# Patient Record
Sex: FEMALE | Race: WHITE | NOT HISPANIC OR LATINO | Employment: FULL TIME
[De-identification: names, ages, dates, MRNs, and addresses within clinical notes are randomized per-mention and may not be internally consistent; named-entity substitution may affect disease eponyms.]

---

## 2017-09-10 ENCOUNTER — HEALTH MAINTENANCE LETTER (OUTPATIENT)
Age: 27
End: 2017-09-10

## 2019-10-27 ENCOUNTER — OFFICE VISIT - HEALTHEAST (OUTPATIENT)
Dept: FAMILY MEDICINE | Facility: CLINIC | Age: 29
End: 2019-10-27

## 2019-10-27 DIAGNOSIS — M54.17 RIGHT LUMBOSACRAL RADICULOPATHY: ICD-10-CM

## 2019-11-08 ENCOUNTER — HEALTH MAINTENANCE LETTER (OUTPATIENT)
Age: 29
End: 2019-11-08

## 2020-02-23 ENCOUNTER — HEALTH MAINTENANCE LETTER (OUTPATIENT)
Age: 30
End: 2020-02-23

## 2020-05-27 ENCOUNTER — COMMUNICATION - HEALTHEAST (OUTPATIENT)
Dept: SCHEDULING | Facility: CLINIC | Age: 30
End: 2020-05-27

## 2020-12-06 ENCOUNTER — HEALTH MAINTENANCE LETTER (OUTPATIENT)
Age: 30
End: 2020-12-06

## 2021-01-12 ENCOUNTER — COMMUNICATION - HEALTHEAST (OUTPATIENT)
Dept: SCHEDULING | Facility: CLINIC | Age: 31
End: 2021-01-12

## 2021-06-03 VITALS
WEIGHT: 170.4 LBS | BODY MASS INDEX: 28.36 KG/M2 | HEART RATE: 82 BPM | TEMPERATURE: 98.3 F | DIASTOLIC BLOOD PRESSURE: 78 MMHG | SYSTOLIC BLOOD PRESSURE: 111 MMHG | OXYGEN SATURATION: 98 %

## 2021-06-08 NOTE — TELEPHONE ENCOUNTER
29 y/o female calls about cheek bone to ear and forehead pain, headache to left side of head, Ibuprofen 1 hour ago, pain is 7 out of 10    RN reviewed and discussed protocols for moderate headaches, interventions including lying down in dark quiet place, cold cloth to forehead, stretch and massage, however, protocols do advise follow up in the next 24 hours, warm transfer to be seen in the next 24 hours    Rosa Gaming RN Triage Nurse/[facility] Connections  05/27/2020   1:32AM       Reason for Disposition    [1] MODERATE headache (e.g., interferes with normal activities) AND [2] present > 24 hours AND [3] unexplained  (Exceptions: analgesics not tried, typical migraine, or headache part of viral illness)    Protocols used: HEADACHE-A-AH    Fever 100, no cough or shortness of breath, sore throat lighthead, one visitor, Benja
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
## 2021-10-16 ENCOUNTER — HEALTH MAINTENANCE LETTER (OUTPATIENT)
Age: 31
End: 2021-10-16

## 2022-01-30 ENCOUNTER — HEALTH MAINTENANCE LETTER (OUTPATIENT)
Age: 32
End: 2022-01-30

## 2022-09-25 ENCOUNTER — HEALTH MAINTENANCE LETTER (OUTPATIENT)
Age: 32
End: 2022-09-25

## 2023-02-04 ENCOUNTER — HEALTH MAINTENANCE LETTER (OUTPATIENT)
Age: 33
End: 2023-02-04

## 2023-03-08 ENCOUNTER — APPOINTMENT (OUTPATIENT)
Dept: RADIOLOGY | Facility: HOSPITAL | Age: 33
End: 2023-03-08
Attending: STUDENT IN AN ORGANIZED HEALTH CARE EDUCATION/TRAINING PROGRAM
Payer: COMMERCIAL

## 2023-03-08 ENCOUNTER — HOSPITAL ENCOUNTER (EMERGENCY)
Facility: HOSPITAL | Age: 33
Discharge: HOME | End: 2023-03-08
Attending: STUDENT IN AN ORGANIZED HEALTH CARE EDUCATION/TRAINING PROGRAM | Admitting: STUDENT IN AN ORGANIZED HEALTH CARE EDUCATION/TRAINING PROGRAM
Payer: COMMERCIAL

## 2023-03-08 VITALS
WEIGHT: 170 LBS | TEMPERATURE: 99 F | SYSTOLIC BLOOD PRESSURE: 139 MMHG | HEIGHT: 65 IN | OXYGEN SATURATION: 98 % | RESPIRATION RATE: 18 BRPM | HEART RATE: 69 BPM | DIASTOLIC BLOOD PRESSURE: 77 MMHG | BODY MASS INDEX: 28.32 KG/M2

## 2023-03-08 DIAGNOSIS — R07.9 CHEST PAIN, UNSPECIFIED TYPE: ICD-10-CM

## 2023-03-08 LAB
ALBUMIN SERPL BCG-MCNC: 4 G/DL (ref 3.5–5.2)
ALP SERPL-CCNC: 84 U/L (ref 35–104)
ALT SERPL W P-5'-P-CCNC: 24 U/L (ref 10–35)
ANION GAP SERPL CALCULATED.3IONS-SCNC: 12 MMOL/L (ref 7–15)
AST SERPL W P-5'-P-CCNC: 22 U/L (ref 10–35)
BILIRUB SERPL-MCNC: 0.2 MG/DL
BUN SERPL-MCNC: 8.8 MG/DL (ref 6–20)
CALCIUM SERPL-MCNC: 8.8 MG/DL (ref 8.6–10)
CHLORIDE SERPL-SCNC: 103 MMOL/L (ref 98–107)
CREAT SERPL-MCNC: 0.56 MG/DL (ref 0.51–0.95)
DEPRECATED HCO3 PLAS-SCNC: 22 MMOL/L (ref 22–29)
ERYTHROCYTE [DISTWIDTH] IN BLOOD BY AUTOMATED COUNT: 12.7 % (ref 10–15)
GFR SERPL CREATININE-BSD FRML MDRD: >90 ML/MIN/1.73M2
GLUCOSE SERPL-MCNC: 100 MG/DL (ref 70–99)
HCG UR QL: NEGATIVE
HCT VFR BLD AUTO: 40.2 % (ref 35–47)
HGB BLD-MCNC: 13 G/DL (ref 11.7–15.7)
MCH RBC QN AUTO: 29 PG (ref 26.5–33)
MCHC RBC AUTO-ENTMCNC: 32.3 G/DL (ref 31.5–36.5)
MCV RBC AUTO: 90 FL (ref 78–100)
PLATELET # BLD AUTO: 373 10E3/UL (ref 150–450)
POTASSIUM SERPL-SCNC: 4.1 MMOL/L (ref 3.4–5.3)
PROT SERPL-MCNC: 7.8 G/DL (ref 6.4–8.3)
RBC # BLD AUTO: 4.49 10E6/UL (ref 3.8–5.2)
SODIUM SERPL-SCNC: 137 MMOL/L (ref 136–145)
TROPONIN T SERPL HS-MCNC: <6 NG/L
WBC # BLD AUTO: 8.3 10E3/UL (ref 4–11)

## 2023-03-08 PROCEDURE — 85027 COMPLETE CBC AUTOMATED: CPT | Performed by: STUDENT IN AN ORGANIZED HEALTH CARE EDUCATION/TRAINING PROGRAM

## 2023-03-08 PROCEDURE — 36415 COLL VENOUS BLD VENIPUNCTURE: CPT | Performed by: STUDENT IN AN ORGANIZED HEALTH CARE EDUCATION/TRAINING PROGRAM

## 2023-03-08 PROCEDURE — 71046 X-RAY EXAM CHEST 2 VIEWS: CPT

## 2023-03-08 PROCEDURE — 93005 ELECTROCARDIOGRAM TRACING: CPT | Performed by: STUDENT IN AN ORGANIZED HEALTH CARE EDUCATION/TRAINING PROGRAM

## 2023-03-08 PROCEDURE — 99285 EMERGENCY DEPT VISIT HI MDM: CPT | Mod: 25

## 2023-03-08 PROCEDURE — 80053 COMPREHEN METABOLIC PANEL: CPT | Performed by: STUDENT IN AN ORGANIZED HEALTH CARE EDUCATION/TRAINING PROGRAM

## 2023-03-08 PROCEDURE — 250N000013 HC RX MED GY IP 250 OP 250 PS 637: Performed by: STUDENT IN AN ORGANIZED HEALTH CARE EDUCATION/TRAINING PROGRAM

## 2023-03-08 PROCEDURE — 84484 ASSAY OF TROPONIN QUANT: CPT | Performed by: STUDENT IN AN ORGANIZED HEALTH CARE EDUCATION/TRAINING PROGRAM

## 2023-03-08 PROCEDURE — 81025 URINE PREGNANCY TEST: CPT | Performed by: STUDENT IN AN ORGANIZED HEALTH CARE EDUCATION/TRAINING PROGRAM

## 2023-03-08 RX ORDER — VALACYCLOVIR HYDROCHLORIDE 1 G/1
1000 TABLET, FILM COATED ORAL 3 TIMES DAILY
Qty: 21 TABLET | Refills: 0 | Status: SHIPPED | OUTPATIENT
Start: 2023-03-08 | End: 2023-03-15

## 2023-03-08 RX ORDER — ACETAMINOPHEN 325 MG/1
650 TABLET ORAL ONCE
Status: COMPLETED | OUTPATIENT
Start: 2023-03-08 | End: 2023-03-08

## 2023-03-08 RX ADMIN — ACETAMINOPHEN 650 MG: 325 TABLET ORAL at 11:45

## 2023-03-08 NOTE — ED TRIAGE NOTES
"Right sided chest pain. Pt reports \"zinging\" type pain in middle of chest intermittently. Pain to right side of chest wrapping around to back \"feels like pins and needles.\" pain on right increased with palp. No rash noted.      Triage Assessment     Row Name 03/08/23 1048       Triage Assessment (Adult)    Airway WDL WDL       Respiratory WDL    Respiratory WDL WDL       Skin Circulation/Temperature WDL    Skin Circulation/Temperature WDL WDL       Cardiac WDL    Cardiac WDL chest pain       Peripheral/Neurovascular WDL    Peripheral Neurovascular WDL WDL       Cognitive/Neuro/Behavioral WDL    Cognitive/Neuro/Behavioral WDL WDL              "

## 2023-03-08 NOTE — DISCHARGE INSTRUCTIONS
Your workup today was overall reassuring  Further cardiac or breast testing may be considered by your primary care doctor so follow up closely with them. I will place a cardiology referral you c an consider using as well.  As we discussed this could possibly be shingles, if you develop a rash in this area start the antiviral medication    You can take 1000 mg of tylenol every 6-8 hours (no more than 4000 mg in 24 hours).  You can take 600 mg of ibuprofen with food every 6-8 hours (no more than 3200 mg in 24 hours).     Return to the Emergency Department with new/worsening chest pain, increased difficulty breathing or any worsening symptoms or concerns

## 2023-03-08 NOTE — ED PROVIDER NOTES
NAME: Alberta Whalen  AGE: 32 year old female  YOB: 1990  MRN: [id number]  EVALUATION DATE & TIME: No admission date for patient encounter.    PCP: No Ref-Primary, Physician  ED PROVIDER: Breanna Marroquin MD.    Chief Complaint   Patient presents with     Chest Pain       FINAL IMPRESSION:  1. Chest pain, unspecified type      MEDICAL DECISION MAKIN:25 AM I met with the patient, obtained history, performed an initial exam, and discussed options and plan for diagnostics and treatment here in the ED.   12:51 PM Updated the patient about labs and imaging results. She is comfortable going home and we discussed plans for discharge.     ED Course as of 23 1303   Wed Mar 08, 2023   1145 32-year-old female presents with chest pain.  Last couple days she has had sharp mid chest pain lasts for couple seconds to minutes.  It is not exertional.  Associated with some shortness of breath.  PERC negative, do not suspect PE.  Given the nature of her pain with no sudden tearing or ripping to the back, not significantly hypertensive, low suspicion for dissection.  Her EKG is sinus rhythm without ST elevation or findings to suggest pericarditis.  High-sensitivity troponin <6.  Low suspicion for ACS.  Her heart score is low at 0-1, recommended she follow up with her primary care doctor to consider any additional cardiac workup. No abdominal pain to suspect referred intraabdominal emergency. Her pain is somewhat tender to touch and in the distrubution of a dermatome though no rash is seen, would have concern for possible shingles and will send with antiviral to start if she gets a rash. No findings on exam to suggest breast abscess/infection.   1302 Chest xray without acute findings. Recommended close outpatient follow up. Rx for valacyclovir given and patient to start if develops rash. Strict return precautions discussed and patient is in agreement with plan, endorses understanding and their questions were  "answered.       Medical Decision Making    History:    Supplemental history from: Documented in chart, if applicable    External Record(s) reviewed: Documented in chart, if applicable. (Details documented in chart).    Work Up:    Chart documentation includes differential considered and any EKGs or imaging interpreted by provider.    In additional to work up documented, I considered the following work up: Documented in chart, if applicable.    External consultation:    Discussion of management with another provider: Documented in chart, if applicable    Discussed with radiology regarding test interpretation: N/A    Complicating factors:    Care impacted by chronic illness: N/A    Care affected by social determinants of health: N/A    Disposition considerations: Discharge. I prescribed additional prescription strength medication(s) as charted. N/A.    MEDICATIONS GIVEN IN THE EMERGENCY:  Medications   acetaminophen (TYLENOL) tablet 650 mg (650 mg Oral $Given 3/8/23 7995)       NEW PRESCRIPTIONS STARTED AT TODAY'S ER VISIT:  New Prescriptions    VALACYCLOVIR (VALTREX) 1000 MG TABLET    Take 1 tablet (1,000 mg) by mouth 3 times daily for 7 days        =================================================================  HPI    Patient information was obtained from: Patient   Use of : N/A       Alberta Wahlen is a 32 year old female with no past medical history, who presents via walk in for evaluation of chest pain.    The patient presents with chest pain since Monday (~2 days ago). She explain the pain as a sharp, shooting and throbbing sensation. The pain would come \"randomly\" and last for about 10 seconds. Sometimes the pain feels like a tight and heavy sensation. She locates the pain on her mid chest that radiates to left side chest under the arm. Sometime the pain feels like a nerve pain. She has to wear loose clothes and bra to relieve the pain area. She is unsure if the pain is from her breast or " chest. She states her breast feels firm/hard. The patient took some ibuprofen and Pepcid this morning with minimal relief. Her last menstrual cycle was just recently. She recently had a sinus infection and was prescribed doxycycline in which she just finished. In the ED, she endorses lightheadedness and fatigue. The patient has a family history of breast cancer.     She denies family history of heart failure/problem. She denies any recent travel. Denies smoking tobacco or drugs. Denies taking birth control. The patient denies leg swelling, shortness of breath, abdominal pain, rash, and any other complaints or concerns at the moment.       REVIEW OF SYSTEMS   All systems reviewed, please see HPI for pertinent findings    PAST MEDICAL HISTORY:  Past Medical History:   Diagnosis Date     NO ACTIVE PROBLEMS (aka NONE)        PAST SURGICAL HISTORY:  Past Surgical History:   Procedure Laterality Date     HC TOOTH EXTRACTION W/FORCEP  2013       CURRENT MEDICATIONS:    No current facility-administered medications for this encounter.    Current Outpatient Medications:      valACYclovir (VALTREX) 1000 mg tablet, Take 1 tablet (1,000 mg) by mouth 3 times daily for 7 days, Disp: 21 tablet, Rfl: 0     Prenatal Vit-Fe Fumarate-FA (PRENATAL MULTIVITAMIN  PLUS IRON) 27-0.8 MG TABS, Take 1 tablet by mouth daily, Disp: , Rfl:     ALLERGIES:  Allergies   Allergen Reactions     Ceclor Cd [Cefaclor Monohydrate]      Septra [Sulfamethoxazole W/Trimethoprim]      Zithromax [Azithromycin]        FAMILY HISTORY:  Family History   Problem Relation Age of Onset     Breast Cancer Mother 30     Lipids Maternal Grandfather      Thyroid Disease Mother         hypothyroid     Thyroid Disease Maternal Aunt         hypothyroid     Thyroid Disease Maternal Aunt         hypothyroid       SOCIAL HISTORY:   Social History     Socioeconomic History     Marital status: Single     Number of children: 0   Occupational History     Employer: [employer]   Tobacco  "Use     Smoking status: Never     Smokeless tobacco: Never   Substance and Sexual Activity     Alcohol use: No     Alcohol/week: 0.0 standard drinks     Drug use: No     Sexual activity: Yes     Partners: Male   Other Topics Concern      Service No     Blood Transfusions No     Caffeine Concern No     Comment: cup coffee     Occupational Exposure No     Hobby Hazards No     Sleep Concern No     Stress Concern No     Weight Concern No     Special Diet No     Back Care No     Exercise Yes     Seat Belt Yes   Social History Narrative    Caffeine intake/servings daily - 0    Calcium intake/servings daily - 3    Exercise 3 times weekly - describe cardio,weights    Sunscreen used - Yes    Seatbelts used - Yes    Guns stored in the home - No    Self Breast Exam - Yes    Pap test up to date -  Yes    Eye exam up to date -  Yes    Dental exam up to date -  Yes    DEXA scan up to date -  No    Flex Sig/Colonoscopy up to date -  No    Mammography up to date -  No    Immunizations reviewed and up to date - Yes    Abuse: Current or Past (Physical, Sexual or Emotional) - No    Do you feel safe in your environment - Yes    Do you cope well with stress - Yes    Do you suffer from insomnia - No    Last updated by: Camilla Lewis  9/4/2014           PHYSICAL EXAM:    Vitals: BP (!) 154/95   Pulse 88   Temp 99  F (37.2  C)   Resp 18   Ht 1.651 m (5' 5\")   Wt 77.1 kg (170 lb)   LMP 03/02/2023   SpO2 100%   BMI 28.29 kg/m     Constitutional: Well developed, well nourished. No acute distress  HENT: Normocephalic, atraumatic  Eyes: Pupils mid-range, sclera white, no discharge  Respiratory: CTAB, no respiratory distres,, speaks full sentences easily.  Cardiovascular: Normal heart rate, regular rhythm, no murmurs. No lower extremity edema  GI: Soft, not distended, not tender to palpation  Musculoskeletal: Has some tenderness to the right anterior chest wall that spreads some to the right lateral side, no rash in " this area, no crepitus or deformity. Moving all 4 extremities intentionally and without pain  Breast (done with scribe in room): no erythema/drainage or palpable mass noted to the right breast  Skin: Warm, dry, no rash  Neurologic: Alert & oriented, speech clear, ambulates with normal gait, no focal deficits noted    LAB:  All pertinent labs reviewed and interpreted.  Labs Ordered and Resulted from Time of ED Arrival to Time of ED Departure   COMPREHENSIVE METABOLIC PANEL - Abnormal       Result Value    Sodium 137      Potassium 4.1      Chloride 103      Carbon Dioxide (CO2) 22      Anion Gap 12      Urea Nitrogen 8.8      Creatinine 0.56      Calcium 8.8      Glucose 100 (*)     Alkaline Phosphatase 84      AST 22      ALT 24      Protein Total 7.8      Albumin 4.0      Bilirubin Total 0.2      GFR Estimate >90     HCG QUALITATIVE URINE - Normal    hCG Urine Qualitative Negative     CBC WITH PLATELETS - Normal    WBC Count 8.3      RBC Count 4.49      Hemoglobin 13.0      Hematocrit 40.2      MCV 90      MCH 29.0      MCHC 32.3      RDW 12.7      Platelet Count 373     TROPONIN T, HIGH SENSITIVITY - Normal    Troponin T, High Sensitivity <6         RADIOLOGY:  Chest XR,  PA & LAT   Final Result   IMPRESSION: Negative chest. Lungs are clear. Normal heart size. Surgical changes spine.                EKG:   Performed at: 08-MAR-2023 10:51  Impression: Normal sinus   Rate: 76  Rhythm: normal sinus  QRS Interval: 78  QTc Interval: 432  Comparison: No previous ECGs.   I have independently reviewed and interpreted the EKG(s) documented above.     PROCEDURES:   Procedures     I, Billie , am serving as a scribe to document services personally performed by Dr. Breanna Marroquin based on my observation and the provider's statements to me. I, Breanna Marroquin MD attest that Billie Her is acting in a scribe capacity, has observed my performance of the services and has documented them in accordance with my direction.    Breanna  EZIO Marroquin.  Emergency Medicine  [facility] EMERGENCY DEPARTMENT  [facility] [facility] [id number] [phone]  Dept: [phone]       Breanna Marroquin MD  03/08/23 4441       Breanna Marroquin MD  03/08/23 9075

## 2023-03-11 LAB
ATRIAL RATE - MUSE: 76 BPM
DIASTOLIC BLOOD PRESSURE - MUSE: NORMAL MMHG
INTERPRETATION ECG - MUSE: NORMAL
P AXIS - MUSE: 17 DEGREES
PR INTERVAL - MUSE: 160 MS
QRS DURATION - MUSE: 78 MS
QT - MUSE: 384 MS
QTC - MUSE: 432 MS
R AXIS - MUSE: 11 DEGREES
SYSTOLIC BLOOD PRESSURE - MUSE: NORMAL MMHG
T AXIS - MUSE: 28 DEGREES
VENTRICULAR RATE- MUSE: 76 BPM

## 2024-03-02 ENCOUNTER — HEALTH MAINTENANCE LETTER (OUTPATIENT)
Age: 34
End: 2024-03-02

## 2025-03-15 ENCOUNTER — HEALTH MAINTENANCE LETTER (OUTPATIENT)
Age: 35
End: 2025-03-15

## 2025-03-21 NOTE — PATIENT INSTRUCTIONS - HE
Patient Instructions by Joseph Cazares DO at 10/27/2019  1:00 PM     Author: Joseph Cazares DO Service: -- Author Type: Physician    Filed: 10/27/2019  1:42 PM Encounter Date: 10/27/2019 Status: Addendum    : Joseph Cazares DO (Physician)    Related Notes: Original Note by Joseph Cazares DO (Physician) filed at 10/27/2019  1:42 PM       Caution that the hydrocodone may cause drowsiness and constipation, and can lead to psychological addiction in some people. There is also acetaminophen in this medication, so do not take other products containing this when using the prescription. Use your meloxicam, or OTC naproxen or ibuprofen inititially for pain relief. Someone should call you tomorrow about the referral.             Patient Education     Understanding Lumbar Radiculopathy    Lumbar radiculopathy is irritation or inflammation of a nerve root in the low back. It causes symptoms that spread out from the back down one or both legs. To understand this condition, it helps to understand the parts of the spine:    Vertebrae. These are bones that stack to form the spine. The lumbar spine contains the 5 bottom vertebrae.    Disks. These are soft pads of tissue between the vertebrae. They act as shock absorbers for the spine.    Spinal canal. This is a tunnel formed within the stacked vertebrae. In the lumbar spine, nerves run through this canal.    Nerves. These branch off and leave the spinal canal, traveling out to parts of the body. As they leave the spinal canal, nerves pass through openings between the vertebrae. The nerve root is the part of the nerve that is closest to the spinal canal.    Sciatic nerve. This is a large nerve formed from several nerve roots in the low back. This nerve extends down the back of the leg to the foot.  With lumbar radiculopathy, nerve roots in the low back become irritated. This leads to pain and symptoms. The sciatic nerve is commonly involved, so the condition is often called  sciatica.  What causes lumbar radiculopathy?  Aging, injury, poor posture, extra body weight, and other issues can lead to problems in the low back. These problems may then irritate nerve roots. They include:    Damage to a disk in the lumbar spine. The damaged disk may then press on nearby nerve roots.    Degeneration from wear and tear, and aging. This can lead to narrowing (stenosis) of the openings between the vertebrae. The narrowed openings press on nerve roots as they leave the spinal canal.    Unstable spine. This is when a vertebra slips forward. It can then press on a nerve root.  Other, less common things can put pressure on nerves in the low back. These include diabetes, infection, or a tumor.  Symptoms of lumbar radiculopathy  These include:    Pain in the low back    Pain, numbness, tingling, or weakness that travels into the buttocks, hip, groin, or leg    Muscle spasms  Treatment for lumbar radiculopathy  In most cases, your healthcare provider will first try treatments that help relieve symptoms. These may include:    Prescription and over-the-counter pain medicines. These help relieve pain, swelling, and irritation.    Limits on positions and activities that increase pain. But lying in bed or avoiding all movement is only recommended for a short period of time.    Physical therapy, including exercises and stretches. This helps decrease pain and increase movement and function.    Steroid shots into the lower back. This may help relieve symptoms for a time.    Weight-loss program. If you are overweight, losing extra pounds may help relieve symptoms.  In some cases, you may need surgery to fix the underlying problem. This depends on the cause, the symptoms, and how long the pain has lasted.  Possible complications  Over time, an irritated and inflamed nerve may become damaged. This may lead to long-lasting (permanent) numbness or weakness in your legs and feet. If symptoms change suddenly or get  worse, be sure to let your healthcare provider know.  When to call your healthcare provider  Call your healthcare provider right away if you have any of these:    New pain or pain that gets worse    New or increasing weakness, tingling, or numbness in your leg or foot    Problems controlling your bladder or bowel   Date Last Reviewed: 3/10/2016    3318-9161 [employer]. [address] [place] [address]. All rights reserved. This information is not intended as a substitute for professional medical care. Always follow your healthcare professional's instructions.                 21-Mar-2025 13:46